# Patient Record
(demographics unavailable — no encounter records)

---

## 2025-05-15 NOTE — HISTORY OF PRESENT ILLNESS
[FreeTextEntry8] : Patient is a 19 yo male presenting for b/l scrotal pain that started 1 week ago. Patient states the pain started randomly and denies any trauma or injury. Patient is not sexually active. Denies any penile discharge or bleeding. Denies any pain, burning, or itching in the scrotal area.  Of note, patient has CPE and blood work done last month. All other ROS negative.

## 2025-05-15 NOTE — PLAN
[FreeTextEntry1] : Bilateral scrotal pain  - Patient states the pain is now improved  - Denies any pain or burning sensation  - Ordered HIV, syphilis, gonorrhea and chlamydia, HSV 1,2, acute hepatitis panel  - Ordered US duplex scrotal complete and US pelvis complete

## 2025-05-15 NOTE — PHYSICAL EXAM
[No Acute Distress] : no acute distress [Well Nourished] : well nourished [Well Developed] : well developed [Well-Appearing] : well-appearing [EOMI] : extraocular movements intact [No Respiratory Distress] : no respiratory distress  [No Accessory Muscle Use] : no accessory muscle use [Clear to Auscultation] : lungs were clear to auscultation bilaterally [Normal Rate] : normal rate  [Regular Rhythm] : with a regular rhythm [Normal S1, S2] : normal S1 and S2 [No Joint Swelling] : no joint swelling [No Rash] : no rash [No Focal Deficits] : no focal deficits [Alert and Oriented x3] : oriented to person, place, and time [de-identified] : - patient showed picture of scrotal area, no discharge or bleeding seen